# Patient Record
Sex: MALE | Race: WHITE | NOT HISPANIC OR LATINO | Employment: FULL TIME | URBAN - METROPOLITAN AREA
[De-identification: names, ages, dates, MRNs, and addresses within clinical notes are randomized per-mention and may not be internally consistent; named-entity substitution may affect disease eponyms.]

---

## 2018-06-22 ENCOUNTER — OFFICE VISIT CONVERTED (OUTPATIENT)
Dept: PODIATRY | Facility: CLINIC | Age: 53
End: 2018-06-22
Attending: PODIATRIST

## 2018-10-23 ENCOUNTER — CONVERSION ENCOUNTER (OUTPATIENT)
Dept: NEUROLOGY | Facility: CLINIC | Age: 53
End: 2018-10-23

## 2018-10-23 ENCOUNTER — OFFICE VISIT CONVERTED (OUTPATIENT)
Dept: NEUROSURGERY | Facility: CLINIC | Age: 53
End: 2018-10-23
Attending: PHYSICIAN ASSISTANT

## 2018-12-18 ENCOUNTER — CONVERSION ENCOUNTER (OUTPATIENT)
Dept: NEUROLOGY | Facility: CLINIC | Age: 53
End: 2018-12-18

## 2018-12-18 ENCOUNTER — OFFICE VISIT CONVERTED (OUTPATIENT)
Dept: NEUROSURGERY | Facility: CLINIC | Age: 53
End: 2018-12-18
Attending: PHYSICIAN ASSISTANT

## 2020-03-18 ENCOUNTER — OFFICE VISIT CONVERTED (OUTPATIENT)
Dept: ORTHOPEDIC SURGERY | Facility: CLINIC | Age: 55
End: 2020-03-18
Attending: PHYSICIAN ASSISTANT

## 2020-04-22 ENCOUNTER — TELEPHONE CONVERTED (OUTPATIENT)
Dept: ORTHOPEDIC SURGERY | Facility: CLINIC | Age: 55
End: 2020-04-22
Attending: PHYSICIAN ASSISTANT

## 2020-04-22 ENCOUNTER — CONVERSION ENCOUNTER (OUTPATIENT)
Dept: ORTHOPEDIC SURGERY | Facility: CLINIC | Age: 55
End: 2020-04-22

## 2020-05-13 ENCOUNTER — OFFICE VISIT CONVERTED (OUTPATIENT)
Dept: ORTHOPEDIC SURGERY | Facility: CLINIC | Age: 55
End: 2020-05-13
Attending: ORTHOPAEDIC SURGERY

## 2020-05-15 ENCOUNTER — HOSPITAL ENCOUNTER (OUTPATIENT)
Dept: PERIOP | Facility: HOSPITAL | Age: 55
Setting detail: HOSPITAL OUTPATIENT SURGERY
Discharge: HOME | End: 2020-05-15
Attending: ORTHOPAEDIC SURGERY

## 2020-05-29 ENCOUNTER — CONVERSION ENCOUNTER (OUTPATIENT)
Dept: ORTHOPEDIC SURGERY | Facility: CLINIC | Age: 55
End: 2020-05-29

## 2020-05-29 ENCOUNTER — OFFICE VISIT CONVERTED (OUTPATIENT)
Dept: ORTHOPEDIC SURGERY | Facility: CLINIC | Age: 55
End: 2020-05-29
Attending: ORTHOPAEDIC SURGERY

## 2020-06-09 ENCOUNTER — HOSPITAL ENCOUNTER (OUTPATIENT)
Dept: PHYSICAL THERAPY | Facility: CLINIC | Age: 55
Setting detail: RECURRING SERIES
Discharge: STILL ADMITTED OR ADMINISTRATIVE | End: 2020-09-17
Attending: ORTHOPAEDIC SURGERY

## 2020-06-19 ENCOUNTER — OFFICE VISIT CONVERTED (OUTPATIENT)
Dept: ORTHOPEDIC SURGERY | Facility: CLINIC | Age: 55
End: 2020-06-19
Attending: PHYSICIAN ASSISTANT

## 2020-07-31 ENCOUNTER — CONVERSION ENCOUNTER (OUTPATIENT)
Dept: ORTHOPEDIC SURGERY | Facility: CLINIC | Age: 55
End: 2020-07-31

## 2020-07-31 ENCOUNTER — OFFICE VISIT CONVERTED (OUTPATIENT)
Dept: ORTHOPEDIC SURGERY | Facility: CLINIC | Age: 55
End: 2020-07-31
Attending: PHYSICIAN ASSISTANT

## 2020-08-14 ENCOUNTER — OFFICE VISIT CONVERTED (OUTPATIENT)
Dept: ORTHOPEDIC SURGERY | Facility: CLINIC | Age: 55
End: 2020-08-14
Attending: PHYSICIAN ASSISTANT

## 2020-09-24 ENCOUNTER — HOSPITAL ENCOUNTER (OUTPATIENT)
Dept: PHYSICAL THERAPY | Facility: CLINIC | Age: 55
Setting detail: RECURRING SERIES
Discharge: HOME | End: 2020-09-30
Attending: ORTHOPAEDIC SURGERY

## 2020-09-25 ENCOUNTER — OFFICE VISIT CONVERTED (OUTPATIENT)
Dept: ORTHOPEDIC SURGERY | Facility: CLINIC | Age: 55
End: 2020-09-25
Attending: PHYSICIAN ASSISTANT

## 2020-09-25 ENCOUNTER — CONVERSION ENCOUNTER (OUTPATIENT)
Dept: ORTHOPEDIC SURGERY | Facility: CLINIC | Age: 55
End: 2020-09-25

## 2020-11-18 ENCOUNTER — OFFICE VISIT CONVERTED (OUTPATIENT)
Dept: NEUROLOGY | Facility: CLINIC | Age: 55
End: 2020-11-18
Attending: PSYCHIATRY & NEUROLOGY

## 2020-11-20 ENCOUNTER — OFFICE VISIT CONVERTED (OUTPATIENT)
Dept: ORTHOPEDIC SURGERY | Facility: CLINIC | Age: 55
End: 2020-11-20
Attending: ORTHOPAEDIC SURGERY

## 2020-11-24 ENCOUNTER — HOSPITAL ENCOUNTER (OUTPATIENT)
Dept: ORTHOPEDIC SURGERY | Facility: CLINIC | Age: 55
Setting detail: RECURRING SERIES
Discharge: HOME | End: 2021-01-10
Attending: ORTHOPAEDIC SURGERY

## 2021-01-04 ENCOUNTER — OFFICE VISIT CONVERTED (OUTPATIENT)
Dept: ORTHOPEDIC SURGERY | Facility: CLINIC | Age: 56
End: 2021-01-04
Attending: ORTHOPAEDIC SURGERY

## 2021-03-10 ENCOUNTER — HOSPITAL ENCOUNTER (OUTPATIENT)
Dept: PHYSICAL THERAPY | Facility: CLINIC | Age: 56
Setting detail: RECURRING SERIES
Discharge: HOME | End: 2021-04-12
Attending: FAMILY MEDICINE

## 2021-03-24 ENCOUNTER — HOSPITAL ENCOUNTER (OUTPATIENT)
Dept: URGENT CARE | Facility: CLINIC | Age: 56
Discharge: HOME | End: 2021-03-24
Attending: FAMILY MEDICINE

## 2021-03-24 LAB — SARS-COV-2 RNA SPEC QL NAA+PROBE: NOT DETECTED

## 2021-05-10 NOTE — H&P
History and Physical      Patient Name: Jorge King   Patient ID: [id number]   Sex: Male   YOB: 1965    Primary Care Provider: Kana Liang MD   Referring Provider: Radha Resendiz PA-C    Visit Date: November 20, 2020    Provider: Abhijit Pretty MD   Location: [facility]   Location Address: [address] [place] [address]  [id number]   Location Phone: [phone]          Chief Complaint  · Left Wrist Pain      History Of Present Illness  Jorge King is a 55 year old /White male who presents today to [facility]. Patient is following up for his rotator cuff surgery performed on 5/15/2020. He seen a neurologist because of the issue he had with the swelling and pain in his hand shortly starting after surgery. He feels like he has done well with the shoulder repair, but he has had some stiffness of his PIP and MCP joints of his fingers and hand that has shown improvement since last time he seen us.       Past Medical History  Arthritis; Herniated nucleus pulposus, L2-3 left; Herniated nucleus pulposus, L3-4; High cholesterol; Hyperlipemia; Hyperlipidemia; Leg pain; Leg swelling; Muscle cramps; Seasonal allergies         Past Surgical History  Shoulder surgery         Medication List  atorvastatin 20 mg oral tablet; diclofenac sodium 75 mg oral tablet,delayed release (DR/EC); fluticasone propionate 50 mcg/actuation nasal spray,suspension; gabapentin 300 mg oral capsule; loratadine 10 mg oral tablet; meclizine 25 mg oral tablet; montelukast 10 mg oral tablet; ondansetron 4 mg oral tablet,disintegrating         Allergy List  NO KNOWN DRUG ALLERGIES         Family Medical History  Stroke; Family history of Arthritis; Family history of stroke         Social History  Alcohol (Never); Claustophobic (Unknown); lives with children; lives with spouse; ; Recreational Drug Use (Never); Tobacco (Current every day); Working         Review of  "Systems  · Constitutional  o Denies  o : fever, chills, weight loss  · Cardiovascular  o Denies  o : chest pain, shortness of breath  · Gastrointestinal  o Denies  o : liver disease, heartburn, nausea, blood in stools  · Genitourinary  o Denies  o : painful urination, blood in urine  · Integument  o Denies  o : rash, itching  · Neurologic  o Denies  o : headache, weakness, loss of consciousness  · Musculoskeletal  o Denies  o : painful, swollen joints  · Psychiatric  o Denies  o : drug/alcohol addiction, anxiety, depression      Vitals  Date Time BP Position Site L\R Cuff Size HR RR TEMP (F) WT  HT  BMI kg/m2 BSA m2 O2 Sat FR L/min FiO2        11/20/2020 01:17 PM      101 - R   162lbs 0oz 5'  9\" 23.92 1.89 99 %            Physical Examination  · Constitutional  o Appearance  o : well developed, well-nourished, no obvious deformities present  · Head and Face  o Head  o :   § Inspection  § : normocephalic  o Face  o :   § Inspection  § : no facial lesions  · Eyes  o Conjunctivae  o : conjunctivae normal  o Sclerae  o : sclerae white  · Ears, Nose, Mouth and Throat  o Ears  o :   § External Ears  § : appearance within normal limits  § Hearing  § : intact  o Nose  o :   § External Nose  § : appearance normal  · Neck  o Inspection/Palpation  o : normal appearance  o Range of Motion  o : full range of motion  · Respiratory  o Respiratory Effort  o : breathing unlabored  o Inspection of Chest  o : normal appearance  o Auscultation of Lungs  o : no audible wheezing or rales  · Cardiovascular  o Heart  o : regular rate  · Gastrointestinal  o Abdominal Examination  o : soft and non-tender  · Skin and Subcutaneous Tissue  o General Inspection  o : intact, no rashes  · Psychiatric  o General  o : Alert and oriented x3  o Judgement and Insight  o : judgment and insight intact  o Mood and Affect  o : mood normal, affect appropriate  · Right Shoulder  o Inspection  o : Appearance of his shoulder, he has well-healed scars on " shoulder. He has full forward flexion. Good strength empty can testing of rotator cuff.  · Left Wrist  o Inspection  o : He does have near full passive MCP, PIP and DIP joint. Flexion actively stops just short of making a full fist and has decreased strength compared to the other side, but has improved since my last evaluation of him.          Assessment  · Right rotator cuff status-post repair-Aftercare following surgery of the muskuloskeletal system     V54.81  · Left wrist pain     719.43/M25.532  · Complex regional pain syndrome, right upper extremity s/p surgical intervention.     337.20/G90.50      Plan  · Medications  o Medications have been Reconciled  o Transition of Care or Provider Policy  · Instructions  o Reviewed the patient's Past Medical, Social, and Family history as well as the ROS at today's visit, no changes.  o Call or return if worsening symptoms.  o This note is transcribed by Ivette phillips Getting occupational therapy for evaluation of his complex region pain syndrome. Start on Neurontin. He is going to follow-back up and be evaluated in 6 weeks.             Electronically Signed by: Ivette Thomas, -Author on December 2, 2020 02:40:50 PM  Electronically Co-signed by: Abhijit Pretty MD -Reviewer on December 2, 2020 05:01:22 PM

## 2021-05-10 NOTE — H&P
History and Physical      Patient Name: Jorge King   Patient ID: [id number]   Sex: Male   YOB: 1965    Primary Care Provider: Kana Liang MD   Referring Provider: Kana Liang MD    Visit Date: November 18, 2020    Provider: Devonte Moyer MD   Location: [facility]   Location Address: [address] [place] [address]  [id number]   Location Phone: [phone]          Chief Complaint     RUE pain and weakness       History Of Present Illness  Jorge King is a 55 year old /White male who presents today to [facility] today referred from Kana Liang MD.      55-year-old man evaluated for nerve conduction EMG study.  He is complaining of difficulty closing his right hand after he had surgery to his right shoulder and developed right olecranon bursitis which required aspiration.  He states that his problem is his right hand swells up and is painful and it is difficult for him to close his hand.  He is here today for nerve conduction EMG study.       Past Medical History  Arthritis; Herniated nucleus pulposus, L2-3 left; Herniated nucleus pulposus, L3-4; High cholesterol; Hyperlipemia; Hyperlipidemia; Leg pain; Leg swelling; Muscle cramps; Seasonal allergies         Past Surgical History  Shoulder surgery         Medication List  atorvastatin 20 mg oral tablet; diclofenac sodium 75 mg oral tablet,delayed release (DR/EC); fluticasone propionate 50 mcg/actuation nasal spray,suspension; loratadine 10 mg oral tablet; meclizine 25 mg oral tablet; montelukast 10 mg oral tablet; ondansetron 4 mg oral tablet,disintegrating         Allergy List  NO KNOWN DRUG ALLERGIES       Allergies Reconciled  Family Medical History  Stroke; Family history of Arthritis; Family history of stroke         Social History  Alcohol (Never); Claustophobic (Unknown); lives with children; lives with spouse; ; Recreational Drug Use (Never); Tobacco  "(Current every day); Working         Review of Systems  · Constitutional  o Denies  o : chills, excessive sweating, fatigue, fever, sycope/passing out, weight gain, weight loss  · Eyes  o Denies  o : changes in vision, blurry vision, double vision  · HENT  o Denies  o : loss of hearing, ringing in the ears, ear aches, sore throat, nasal congestion, sinus pain, nose bleeds, seasonal allergies  · Cardiovascular  o Denies  o : blood clots, swollen legs, anemia, easy burising or bleeding, transfusions  · Respiratory  o Denies  o : shortness of breath, dry cough, productive cough, pneumonia, COPD  · Gastrointestinal  o Denies  o : difficulty swallowing, reflux  · Genitourinary  o Denies  o : incontinence  · Neurologic  o Admits  o : numbness/tingling/paresthesia   o Denies  o : headache, seizure, stroke, tremor, loss of balance, falls, dizziness/vertigo, difficulty with sleep, difficulty with coordination, difficulty with dexterity, weakness  · Musculoskeletal  o Admits  o : muscle aches, weakness  o Denies  o : neck stiffness/pain, swollen lymph nodes, joint pain, spasms, sciatica, pain radiating in arm, pain radiating in leg, low back pain  · Endocrine  o Denies  o : diabetes, thyroid disorder  · Psychiatric  o Denies  o : anxiety, depression      Vitals  Date Time BP Position Site L\R Cuff Size HR RR TEMP (F) WT  HT  BMI kg/m2 BSA m2 O2 Sat FR L/min FiO2 HC       11/18/2020 09:49 /85 Sitting    100 - R   162lbs 0oz 5'  9\" 23.92 1.89             Physical Examination     There is giveaway weakness of the upper extremity on examination however when the EMG study was performed there is no weakness of the right upper extremities on individual muscle testing.  There is limited range of motion of the right hand and trying to close it.  He cannot make a fist.           Assessment  · Weakness of right arm     729.89/R29.898  The EMG study of the right upper extremity is normal and does not show electrophysiologic " evidence for cervical radiculopathy involving the C5-T1 ventral nerve roots. There is no evidence of denervation or reinnervation in the muscles tested. Nerve conduction study shows mild bilateral carpal tunnel syndrome. This does not explain his symptoms of difficulty in making a fist with his right hand. It is possible that the patient has developed complex regional pain syndrome what used to be called the hand shoulder syndrome. I will leave the diagnosis and differential diagnosis to orthopedics.    10 minutes was spent for this straightforward complexity encounter more than half the time was spent face-to-face with the patient for examination, counseling, planning and recommendations.      Plan  · Orders  o Muscle test done with Nerve test Complete (07230) - 729.89/R29.898 - 11/18/2020  o Nerve conduction studies; 5-6 studies (43145) - 729.89/R29.898 - 11/18/2020  · Medications  o Medications have been Reconciled  o Transition of Care or Provider Policy  · Instructions  o Encouraged to follow-up with Primary Care Provider for preventative care.            Electronically Signed by: Devonte Moyer MD -Author on November 18, 2020 11:36:06 AM

## 2021-05-12 NOTE — PROGRESS NOTES
"   Quick Note      Patient Name: Jorge King   Patient ID: [id number]   Sex: Male   YOB: 1965    Primary Care Provider: Kana Liang MD   Referring Provider: Kana Liang MD    Visit Date: April 22, 2020    Provider: Radha Resendiz PA-C   Location: [facility]   Location Address: [address] [place] [address]  [id number]   Location Phone: [phone]          History Of Present Illness  TELEHEALTH TELEPHONE VISIT  Chief Complaint: Right shoulder pain   Jorge King is a 55 year old /White male who is presenting for evaluation via telehealth telephone visit. Verbal consent obtained before beginning visit.   Provider spent 5 minutes with the patient during telehealth visit.   The following staff were present during this visit: Radha Resendiz PA-C   Past Medical History/Overview of Patient Symptoms     He is following up for right shoulder pain ongoing for several months. He states history of midshaft clavicle fracture many years ago. He states pain is in anterior shoulder and points to AC joint. Dr. Villalobos injected subacromial space without improvement. MRI revealed partial RC tear. He was advised to take diclofenac only PRN.  AC joint was injected at last visit, which relieved pain more effectively than subacromial injection, but has since worn off.      Assessment: Right AC arthrosis, shoulder impingement, pRCT  Plan: Follow up 3 weeks, consider arthroscopy, possible RCR       Vitals  Date Time BP Position Site L\R Cuff Size HR RR TEMP (F) WT  HT  BMI kg/m2 BSA m2 O2 Sat HC       04/22/2020 10:23 AM         155lbs 0oz 5'  9.75\" 22.4 1.86               Plan  · Instructions  o Plan Of Care:             Electronically Signed by: Radha Resendiz PA-C -Author on April 22, 2020 10:30:55 AM  Electronically Co-signed by: Abhijit Pretty MD -Reviewer on April 22, 2020 11:53:13 AM  "

## 2021-05-13 NOTE — PROGRESS NOTES
Progress Note      Patient Name: Jorge King   Patient ID: [id number]   Sex: Male   YOB: 1965    Primary Care Provider: Kana Liang MD   Referring Provider: Kana Liang MD    Visit Date: August 14, 2020    Provider: Radha Resendiz PA-C   Location: [facility]   Location Address: [address] [place] [address]  [id number]   Location Phone: [phone]          Chief Complaint  · Right Shoulder Pain      History Of Present Illness  Jorge King is a 55 year old /White male who presents today to [facility]. He is following-up for right rotator cuff repair on May 15, 2020 and also right olecranon bursitis, which I aspirated. He states his elbow swelling did not come back and he continues to have some swelling in his right hand and numbness and shooting pain in his fingers.       Past Medical History  Arthritis; Herniated nucleus pulposus, L2-3 left; Herniated nucleus pulposus, L3-4; High cholesterol; Hyperlipemia; Hyperlipidemia; Leg pain; Leg swelling; Muscle cramps; Seasonal allergies         Past Surgical History  *Denies any surgical procedures; *I have had no surgeries; I have had no surgeries         Medication List  atorvastatin 10 mg oral tablet; fluticasone propionate 50 mcg/actuation nasal spray,suspension; loratadine 10 mg oral tablet; meclizine 25 mg oral tablet; montelukast 10 mg oral tablet; ondansetron 4 mg oral tablet,disintegrating; Voltaren 1 % topical gel         Allergy List  NO KNOWN DRUG ALLERGIES         Family Medical History  Stroke; Family history of Arthritis; Family history of stroke         Social History  Alcohol (Never); Alcohol Use (Never); Claustophobic (Unknown); lives with children; lives with spouse; ; .; Recreational Drug Use (Never); Tobacco (Current every day); Working         Review of Systems  · Constitutional  o Denies  o : fever, chills, weight loss  · Cardiovascular  o Denies  o : chest pain,  "shortness of breath  · Gastrointestinal  o Denies  o : liver disease, heartburn, nausea, blood in stools  · Genitourinary  o Denies  o : painful urination, blood in urine  · Integument  o Denies  o : rash, itching  · Neurologic  o Denies  o : headache, weakness, loss of consciousness  · Musculoskeletal  o Denies  o : painful, swollen joints  · Psychiatric  o Denies  o : drug/alcohol addiction, anxiety, depression      Vitals  Date Time BP Position Site L\R Cuff Size HR RR TEMP (F) WT  HT  BMI kg/m2 BSA m2 O2 Sat        08/14/2020 03:48 PM      86 - R   165lbs 0oz 5'  9.75\" 23.84 1.92 98 %          Physical Examination  · Constitutional  o Appearance  o : well developed, well-nourished, no obvious deformities present  · Head and Face  o Head  o :   § Inspection  § : normocephalic  o Face  o :   § Inspection  § : no facial lesions  · Eyes  o Conjunctivae  o : conjunctivae normal  o Sclerae  o : sclerae white  · Ears, Nose, Mouth and Throat  o Ears  o :   § External Ears  § : appearance within normal limits  § Hearing  § : intact  o Nose  o :   § External Nose  § : appearance normal  · Neck  o Inspection/Palpation  o : normal appearance  o Range of Motion  o : full range of motion  · Respiratory  o Respiratory Effort  o : breathing unlabored  o Inspection of Chest  o : normal appearance  o Auscultation of Lungs  o : no audible wheezing or rales  · Cardiovascular  o Heart  o : regular rate  · Gastrointestinal  o Abdominal Examination  o : soft and non-tender  · Skin and Subcutaneous Tissue  o General Inspection  o : intact, no rashes  · Psychiatric  o General  o : Alert and oriented x3  o Judgement and Insight  o : judgment and insight intact  o Mood and Affect  o : mood normal, affect appropriate  · Right Shoulder  o Inspection  o : shooting pain in his fingers. Right shoulder incisions are well-healed. He has forward flexion to about 130 degrees. Abduction 120 degrees. Internal rotation to L5. External rotation 45 " degrees. He is wearing a compression glove on his right hand. His right elbow, olecranon bursa is non-swollen. Neurovascularly intact.           Assessment  · Right rotator cuff repair-Aftercare following surgery of the muskuloskeletal system     V54.81  · Right olecranon bursitis     727.3/M71.9  · Right shoulder pain, unspecified chronicity     719.41/M25.511  · Right upper extremity radiculopathy     729.2/M54.10      Plan  · Medications  o Medications have been Reconciled  o Transition of Care or Provider Policy  · Instructions  o Reviewed the patient's Past Medical, Social, and Family history as well as the ROS at today's visit, no changes.  o Call or return if worsening symptoms.  o This note is transcribed by Ivette loza/anna  o Going to add some cervical traction to physical therapy. Follow-up in 6 weeks.             Electronically Signed by: Ivette Thomas, -Author on August 18, 2020 12:36:23 PM  Electronically Co-signed by: Radha Resendiz PA-C -Reviewer on August 19, 2020 07:30:20 AM

## 2021-05-13 NOTE — PROGRESS NOTES
Progress Note      Patient Name: Jorge King   Patient ID: [id number]   Sex: Male   YOB: 1965    Primary Care Provider: Kana Liang MD   Referring Provider: Kana Liang MD    Visit Date: May 13, 2020    Provider: Abhjiit Pretty MD   Location: [facility]   Location Address: [address] [place] [address]  [id number]   Location Phone: [phone]          Chief Complaint  · Right Shoulder Pain      History Of Present Illness  Jorge King is a 55 year old /White male who presents today to [facility]. He is here for right shoulder pain. He has history of right shoulder pain. He has a recent MRI. He denies any other complaints besides pain in the shoulder. He wants to consider surgical intervention.       Past Medical History  Arthritis; Herniated nucleus pulposus, L2-3 left; Herniated nucleus pulposus, L3-4; High cholesterol; Hyperlipemia; Hyperlipidemia; Leg pain; Leg swelling; Muscle cramps; Seasonal allergies         Past Surgical History  *Denies any surgical procedures; *I have had no surgeries; I have had no surgeries         Medication List  atorvastatin 10 mg oral tablet; fluticasone propionate 50 mcg/actuation nasal spray,suspension; loratadine 10 mg oral tablet; meclizine 25 mg oral tablet; montelukast 10 mg oral tablet; ondansetron 4 mg oral tablet,disintegrating; Voltaren 1 % topical gel         Allergy List  NO KNOWN DRUG ALLERGIES         Family Medical History  Stroke; Family history of Arthritis; Family history of stroke         Social History  Alcohol (Never); Alcohol Use (Never); Claustophobic (Unknown); lives with children; lives with spouse; ; .; Recreational Drug Use (Never); Tobacco (Current every day); Working         Review of Systems  · Constitutional  o Denies  o : fever, chills, weight loss  · Cardiovascular  o Denies  o : chest pain, shortness of breath  · Gastrointestinal  o Denies  o : liver disease, heartburn,  "nausea, blood in stools  · Genitourinary  o Denies  o : painful urination, blood in urine  · Integument  o Denies  o : rash, itching  · Neurologic  o Denies  o : headache, weakness, loss of consciousness  · Musculoskeletal  o Denies  o : painful, swollen joints  · Psychiatric  o Denies  o : drug/alcohol addiction, anxiety, depression      Vitals  Date Time BP Position Site L\R Cuff Size HR RR TEMP (F) WT  HT  BMI kg/m2 BSA m2 O2 Sat        05/13/2020 09:54 AM      88 - R   164lbs 8oz 5'  9\" 24.29 1.91 99 %          Physical Examination  · Constitutional  o Appearance  o : well developed, well-nourished, no obvious deformities present  · Head and Face  o Head  o :   § Inspection  § : normocephalic  o Face  o :   § Inspection  § : no facial lesions  · Eyes  o Conjunctivae  o : conjunctivae normal  o Sclerae  o : sclerae white  · Ears, Nose, Mouth and Throat  o Ears  o :   § External Ears  § : appearance within normal limits  § Hearing  § : intact  o Nose  o :   § External Nose  § : appearance normal  · Neck  o Inspection/Palpation  o : normal appearance  o Range of Motion  o : full range of motion  · Respiratory  o Respiratory Effort  o : breathing unlabored  o Inspection of Chest  o : normal appearance  o Auscultation of Lungs  o : no audible wheezing or rales  · Cardiovascular  o Heart  o : regular rate  · Gastrointestinal  o Abdominal Examination  o : soft and non-tender  · Skin and Subcutaneous Tissue  o General Inspection  o : intact, no rashes  · Psychiatric  o General  o : Alert and oriented x3  o Judgement and Insight  o : judgment and insight intact  o Mood and Affect  o : mood normal, affect appropriate  · Right Shoulder  o Inspection  o : Appearance of his right shoulder is normal compared to the other side. No atrophy or skin discoloration. He has full range of motion of his shoulder. He has tenderness over the AC joint. Positive impingement sign. He has weakness with external rotation testing and empty " can testing. Neurovascularly intact. Sensation grossly intact.               Assessment  · Right shoulder pain, unspecified chronicity     719.41/M25.511  · Right rotator cuff tear     840.4/M75.100  · Shoulder impingement syndrome, right     726.2/M75.41      Plan  · Medications  o Medications have been Reconciled  o Transition of Care or Provider Policy  · Instructions  o Reviewed the patient's Past Medical, Social, and Family history as well as the ROS at today's visit, no changes.  o Call or return if worsening symptoms.  o Discussed surgery.  o Risks/benefits discussed with patient including, but not limited to: infection, bleeding, neurovascular damage, malunion, nonunion, aesthetic deformity, need for further surgery, and death.  o Surgery pamphlet given.  o This note is transcribed by Ivette castillo  o I reviewed the MRI and we had a long discussion. He wants to proceed with right shoulder arthroscopic subacromial decompression, rotator cuff repair, distal claviculectomy. See him back post-op.  o Electronically Identified Patient Education Materials Provided Electronically            Electronically Signed by: Ivette Thomas, -Author on May 14, 2020 10:35:52 AM  Electronically Co-signed by: Abhijit Pretty MD -Reviewer on May 15, 2020 04:31:43 PM

## 2021-05-13 NOTE — PROGRESS NOTES
Progress Note      Patient Name: Jorge King   Patient ID: [id number]   Sex: Male   YOB: 1965    Primary Care Provider: Kana Liang MD   Referring Provider: Kana Liang MD    Visit Date: July 31, 2020    Provider: Radha Resendiz PA-C   Location: [facility]   Location Address: [address] [place] [address]  [id number]   Location Phone: [phone]          Chief Complaint  · Right shoulder pain      History Of Present Illness  Jorge King is a 55 year old /White male who presents today to [facility].      He is s/p right arthroscopic SAD/DC and mini open RCR 5/15/20 by Dr. Pretty. He is doing well and making progress in PT. He complains of swelling of right elbow, forearm, hand x one month. His PCP drained olecranon bursa 2 weeks ago.       Past Medical History  Arthritis; Herniated nucleus pulposus, L2-3 left; Herniated nucleus pulposus, L3-4; High cholesterol; Hyperlipemia; Hyperlipidemia; Leg pain; Leg swelling; Muscle cramps; Seasonal allergies         Past Surgical History  *Denies any surgical procedures; *I have had no surgeries; I have had no surgeries         Medication List  atorvastatin 10 mg oral tablet; fluticasone propionate 50 mcg/actuation nasal spray,suspension; loratadine 10 mg oral tablet; meclizine 25 mg oral tablet; montelukast 10 mg oral tablet; ondansetron 4 mg oral tablet,disintegrating; Voltaren 1 % topical gel         Allergy List  NO KNOWN DRUG ALLERGIES         Family Medical History  Stroke; Family history of Arthritis; Family history of stroke         Social History  Alcohol (Never); Alcohol Use (Never); Claustophobic (Unknown); lives with children; lives with spouse; ; .; Recreational Drug Use (Never); Tobacco (Current every day); Working         Review of Systems  · Constitutional  o Denies  o : fever, chills, weight loss  · Cardiovascular  o Denies  o : chest pain, shortness of  "breath  · Gastrointestinal  o Denies  o : liver disease, heartburn, nausea, blood in stools  · Genitourinary  o Denies  o : painful urination, blood in urine  · Integument  o Denies  o : rash, itching  · Neurologic  o Denies  o : headache, weakness, loss of consciousness  · Musculoskeletal  o Admits  o : painful, swollen joints  · Psychiatric  o Denies  o : drug/alcohol addiction, anxiety, depression      Vitals  Date Time BP Position Site L\R Cuff Size HR RR TEMP (F) WT  HT  BMI kg/m2 BSA m2 O2 Sat        07/31/2020 02:06 PM         165lbs 0oz 5'  9.75\" 23.84 1.92           Physical Examination  · Constitutional  o Appearance  o : well developed, well-nourished, no obvious deformities present  · Head and Face  o Head  o :   § Inspection  § : normocephalic  o Face  o :   § Inspection  § : no facial lesions  · Eyes  o Conjunctivae  o : conjunctivae normal  o Sclerae  o : sclerae white  · Ears, Nose, Mouth and Throat  o Ears  o :   § External Ears  § : appearance within normal limits  § Hearing  § : intact  o Nose  o :   § External Nose  § : appearance normal  · Neck  o Inspection/Palpation  o : normal appearance  o Range of Motion  o : full range of motion  · Respiratory  o Respiratory Effort  o : breathing unlabored  o Inspection of Chest  o : normal appearance  o Auscultation of Lungs  o : no audible wheezing or rales  · Cardiovascular  o Heart  o : regular rate  · Gastrointestinal  o Abdominal Examination  o : soft and non-tender  · Skin and Subcutaneous Tissue  o General Inspection  o : intact, no rashes  · Psychiatric  o General  o : Alert and oriented x3  o Judgement and Insight  o : judgment and insight intact  o Mood and Affect  o : mood normal, affect appropriate  · Right Shoulder  o Inspection  o : Well healed incisions. Forward flexion 150 degrees. Abduction 100 degrees. ER 45 degrees. Strength 4/5. Neurovascularly intact.   · Right Elbow  o Inspection  o : Fluid collection over olecranon. No erythema " streaking. Mild swelling of forearm and digits. Neurovascularly intact.   · Injection Note/Aspiration Note  o Site  o : elbow  o Procedure  o : Procedure: After educating the patient, patient gave consent for procedure. After using Chloraprep, the joint space was injected. The patient tolerated the procedure well.   o Medication  o : 80 mg of DepoMedrol with 1cc of 1% Lidocaine          Assessment  · Aftercare following surgery of the muskuloskeletal system     V54.81  · Pain: Elbow     719.42/M25.529  · Right shoulder pain, unspecified chronicity     719.41/M25.511  · Olecranon bursitis     726.33/M70.20      Plan  · Orders  o Depo-Medrol injection 80mg () - 719.42/M25.529 - 07/31/2020   Lot [id number] exp 06 21 Romi ROWLAND  o Elbow-Lat Single Tendon (Injection) (45018) - 719.42/M25.529 - 07/31/2020   Lot 08 080 DK exp 08 21 \[facility] [place] [facility]\"" Radha ROWLAND  · Medications  o Medications have been Reconciled  o Transition of Care or Provider Policy  · Instructions  o Reviewed the patient's Past Medical, Social, and Family history as well as the ROS at today's visit, no changes.  o Call or return if worsening symptoms.  o Right elbow aspirated. Follow Up in 2 weeks.            Electronically Signed by: LISA SandersC -Author on July 31, 2020 03:02:11 PM

## 2021-05-13 NOTE — PROGRESS NOTES
Progress Note      Patient Name: Jorge King   Patient ID: [id number]   Sex: Male   YOB: 1965    Primary Care Provider: Kana Liang MD   Referring Provider: Kana Liang MD    Visit Date: June 19, 2020    Provider: Radha Resendiz PA-C   Location: [facility]   Location Address: [address] [place] [address]  [id number]   Location Phone: [phone]          Chief Complaint  · Follow up right shoulder arthroscopy      History Of Present Illness  Jorge King is a 55 year old /White male who presents today to [facility].      He is s/p right arthroscopic SAD/DC and mini open RCR 5/15/20 by Dr. Pretty. He is doing well and making progress in PT.       Past Medical History  Arthritis; Herniated nucleus pulposus, L2-3 left; Herniated nucleus pulposus, L3-4; High cholesterol; Hyperlipemia; Hyperlipidemia; Leg pain; Leg swelling; Muscle cramps; Seasonal allergies         Past Surgical History  *Denies any surgical procedures; *I have had no surgeries; I have had no surgeries         Medication List  atorvastatin 10 mg oral tablet; fluticasone propionate 50 mcg/actuation nasal spray,suspension; loratadine 10 mg oral tablet; meclizine 25 mg oral tablet; montelukast 10 mg oral tablet; ondansetron 4 mg oral tablet,disintegrating; Voltaren 1 % topical gel         Allergy List  NO KNOWN DRUG ALLERGIES       Allergies Reconciled  Family Medical History  Stroke; Family history of Arthritis; Family history of stroke         Social History  Alcohol (Never); Alcohol Use (Never); Claustophobic (Unknown); lives with children; lives with spouse; ; .; Recreational Drug Use (Never); Tobacco (Current every day); Working         Review of Systems  · Constitutional  o Denies  o : fever, chills, weight loss  · Cardiovascular  o Denies  o : chest pain, shortness of breath  · Gastrointestinal  o Denies  o : liver disease, heartburn, nausea, blood in  "stools  · Genitourinary  o Denies  o : painful urination, blood in urine  · Integument  o Denies  o : rash, itching  · Neurologic  o Denies  o : headache, weakness, loss of consciousness  · Musculoskeletal  o Admits  o : painful, swollen joints  · Psychiatric  o Denies  o : drug/alcohol addiction, anxiety, depression      Vitals  Date Time BP Position Site L\R Cuff Size HR RR TEMP (F) WT  HT  BMI kg/m2 BSA m2 O2 Sat        06/19/2020 03:43 PM      91 - R   165lbs 0oz 5'  10\" 23.67 1.92 98 %          Physical Examination  · Constitutional  o Appearance  o : well developed, well-nourished, no obvious deformities present  · Head and Face  o Head  o :   § Inspection  § : normocephalic  o Face  o :   § Inspection  § : no facial lesions  · Eyes  o Conjunctivae  o : conjunctivae normal  o Sclerae  o : sclerae white  · Ears, Nose, Mouth and Throat  o Ears  o :   § External Ears  § : appearance within normal limits  § Hearing  § : intact  o Nose  o :   § External Nose  § : appearance normal  · Neck  o Inspection/Palpation  o : normal appearance  o Range of Motion  o : full range of motion  · Respiratory  o Respiratory Effort  o : breathing unlabored  o Inspection of Chest  o : normal appearance  o Auscultation of Lungs  o : no audible wheezing or rales  · Cardiovascular  o Heart  o : regular rate  · Gastrointestinal  o Abdominal Examination  o : soft and non-tender  · Skin and Subcutaneous Tissue  o General Inspection  o : intact, no rashes  · Psychiatric  o General  o : Alert and oriented x3  o Judgement and Insight  o : judgment and insight intact  o Mood and Affect  o : mood normal, affect appropriate  · Right Shoulder  o Inspection  o : Well healed incisions. Froward flexion 75 degrees. Abduction 60 degrees. ER 45 degrees. Strength 3/5. Neurovascularly intact.           Assessment  · Right Aftercare following surgery of the muskuloskeletal system     V54.81      Plan  · Medications  o Medications have been " Reconciled  o Transition of Care or Provider Policy  · Instructions  o Reviewed the patient's Past Medical, Social, and Family history as well as the ROS at today's visit, no changes.  o Call or return if worsening symptoms.  o Continue PT. Follow up in 5-6 weeks.  o Electronically Identified Patient Education Materials Provided Electronically            Electronically Signed by: LISA SandersC -Author on June 19, 2020 04:36:15 PM

## 2021-05-13 NOTE — PROGRESS NOTES
Progress Note      Patient Name: Jorge King   Patient ID: [id number]   Sex: Male   YOB: 1965    Primary Care Provider: Kana Liang MD   Referring Provider: Kana Liang MD    Visit Date: May 29, 2020    Provider: Abhijit Pretty MD   Location: [facility]   Location Address: [address] [place] [address]  [id number]   Location Phone: [phone]          Chief Complaint  · Surgery follow up right shoulder arthroscopy      History Of Present Illness  Jorge King is a 55 year old /White male who presents today to [facility].      He's following up for right shoulder decompression and rotator cuff repair. He's doing well. He's 2 weeks out from right shoulder arthroscopy. No fevers. No drainage from incisions.       Past Medical History  Arthritis; Herniated nucleus pulposus, L2-3 left; Herniated nucleus pulposus, L3-4; High cholesterol; Hyperlipemia; Hyperlipidemia; Leg pain; Leg swelling; Muscle cramps; Seasonal allergies         Past Surgical History  *Denies any surgical procedures; *I have had no surgeries; I have had no surgeries         Medication List  atorvastatin 10 mg oral tablet; fluticasone propionate 50 mcg/actuation nasal spray,suspension; loratadine 10 mg oral tablet; meclizine 25 mg oral tablet; montelukast 10 mg oral tablet; ondansetron 4 mg oral tablet,disintegrating; Voltaren 1 % topical gel         Allergy List  NO KNOWN DRUG ALLERGIES         Family Medical History  Stroke; Family history of Arthritis; Family history of stroke         Social History  Alcohol (Never); Alcohol Use (Never); Claustophobic (Unknown); lives with children; lives with spouse; ; .; Recreational Drug Use (Never); Tobacco (Current every day); Working         Review of Systems  · Constitutional  o Denies  o : fever, chills, weight loss  · Cardiovascular  o Denies  o : chest pain, shortness of breath  · Gastrointestinal  o Denies  o : liver disease,  "heartburn, nausea, blood in stools  · Genitourinary  o Denies  o : painful urination, blood in urine  · Integument  o Denies  o : rash, itching  · Neurologic  o Denies  o : headache, weakness, loss of consciousness  · Musculoskeletal  o Denies  o : painful, swollen joints  · Psychiatric  o Denies  o : drug/alcohol addiction, anxiety, depression      Vitals  Date Time BP Position Site L\R Cuff Size HR RR TEMP (F) WT  HT  BMI kg/m2 BSA m2 O2 Sat        05/29/2020 03:01 PM      86 - R   160lbs 0oz 5'  10\" 22.96 1.89 98 %          Physical Examination  · Constitutional  o Appearance  o : well developed, well-nourished, no obvious deformities present  · Head and Face  o Head  o :   § Inspection  § : normocephalic  o Face  o :   § Inspection  § : no facial lesions  · Eyes  o Conjunctivae  o : conjunctivae normal  o Sclerae  o : sclerae white  · Ears, Nose, Mouth and Throat  o Ears  o :   § External Ears  § : appearance within normal limits  § Hearing  § : intact  o Nose  o :   § External Nose  § : appearance normal  · Neck  o Inspection/Palpation  o : normal appearance  o Range of Motion  o : full range of motion  · Respiratory  o Respiratory Effort  o : breathing unlabored  o Inspection of Chest  o : normal appearance  o Auscultation of Lungs  o : no audible wheezing or rales  · Cardiovascular  o Heart  o : regular rate  · Gastrointestinal  o Abdominal Examination  o : soft and non-tender  · Skin and Subcutaneous Tissue  o General Inspection  o : intact, no rashes  · Psychiatric  o General  o : Alert and oriented x3  o Judgement and Insight  o : judgment and insight intact  o Mood and Affect  o : mood normal, affect appropriate  · Right Shoulder  o Inspection  o : Incisions well-healed. No signs of infection. Sutures removed today. Minimal swelling. Full elbow, wrist and hand ROM. Passive range of motion to 60 degrees of forward flexion.           Assessment  · S/P Right Shoulder Arthroscopic SADDC and Rotator Cuff " Repair     V54.81      Plan  · Medications  o Medications have been Reconciled  o Transition of Care or Provider Policy  · Instructions  o Reviewed the patient's Past Medical, Social, and Family history as well as the ROS at today's visit, no changes.  o Call or return if worsening symptoms.  o This note was transcribed by Izabella Wallace. anna  o We discussed conservative management and physical therapy to start in 2-3 weeks. I wrote a prescription for that. We discussed sling usage and home exercises. He's going to follow back up in 1 month.             Electronically Signed by: Izabella Wallace - , Other -Author on June 4, 2020 03:09:09 PM  Electronically Co-signed by: Abhijit Pretty MD -Reviewer on June 8, 2020 09:27:29 AM

## 2021-05-13 NOTE — PROGRESS NOTES
Progress Note      Patient Name: Jorge King   Patient ID: [id number]   Sex: Male   YOB: 1965    Primary Care Provider: Kana Liang MD   Referring Provider: Kana Liang MD    Visit Date: September 25, 2020    Provider: Radha Resendiz PA-C   Location: [facility]   Location Address: [address] [place] [address]  [id number]   Location Phone: [phone]          Chief Complaint  · Right Shoulder Pain      History Of Present Illness  Jorge King is a 55 year old /White male who presents today to [facility].      The patient presents here today for follow up evaluation of his left shoulder pain. The patient is s/p right rotator cuff repair on May 15, 2020. He is overall doing well today. He states his motion and strength are improving. I aspirated his right elbow for olecranon bursitis on 7/31/2020. He states his elbow is better, no reoccurring fluid collection.  He does state his right hand is still feeling numb and weak. He also complains today of is right forearm being weak as well.       Past Medical History  Arthritis; Herniated nucleus pulposus, L2-3 left; Herniated nucleus pulposus, L3-4; High cholesterol; Hyperlipemia; Hyperlipidemia; Leg pain; Leg swelling; Muscle cramps; Seasonal allergies         Past Surgical History  *Denies any surgical procedures; *I have had no surgeries; I have had no surgeries         Medication List  atorvastatin 10 mg oral tablet; fluticasone propionate 50 mcg/actuation nasal spray,suspension; loratadine 10 mg oral tablet; meclizine 25 mg oral tablet; montelukast 10 mg oral tablet; ondansetron 4 mg oral tablet,disintegrating; Voltaren 1 % topical gel         Allergy List  NO KNOWN DRUG ALLERGIES       Allergies Reconciled  Family Medical History  Stroke; Family history of Arthritis; Family history of stroke         Social History  Alcohol (Never); Alcohol Use (Never); Claustophobic (Unknown); lives with  "children; lives with spouse; ; .; Recreational Drug Use (Never); Tobacco (Current every day); Working         Review of Systems  · Constitutional  o Denies  o : fever, chills, weight loss  · Cardiovascular  o Denies  o : chest pain, shortness of breath  · Gastrointestinal  o Denies  o : liver disease, heartburn, nausea, blood in stools  · Genitourinary  o Denies  o : painful urination, blood in urine  · Integument  o Denies  o : rash, itching  · Neurologic  o Denies  o : headache, weakness, loss of consciousness  · Musculoskeletal  o Denies  o : painful, swollen joints  · Psychiatric  o Denies  o : drug/alcohol addiction, anxiety, depression      Vitals  Date Time BP Position Site L\R Cuff Size HR RR TEMP (F) WT  HT  BMI kg/m2 BSA m2 O2 Sat        09/25/2020 03:09 PM         165lbs 0oz 5'  9\" 24.37 1.91           Physical Examination  · Constitutional  o Appearance  o : well developed, well-nourished, no obvious deformities present  · Head and Face  o Head  o :   § Inspection  § : normocephalic  o Face  o :   § Inspection  § : no facial lesions  · Eyes  o Conjunctivae  o : conjunctivae normal  o Sclerae  o : sclerae white  · Ears, Nose, Mouth and Throat  o Ears  o :   § External Ears  § : appearance within normal limits  § Hearing  § : intact  o Nose  o :   § External Nose  § : appearance normal  · Neck  o Inspection/Palpation  o : normal appearance  o Range of Motion  o : full range of motion  · Respiratory  o Respiratory Effort  o : breathing unlabored  o Inspection of Chest  o : normal appearance  o Auscultation of Lungs  o : no audible wheezing or rales  · Cardiovascular  o Heart  o : regular rate  · Gastrointestinal  o Abdominal Examination  o : soft and non-tender  · Skin and Subcutaneous Tissue  o General Inspection  o : intact, no rashes  · Psychiatric  o General  o : Alert and oriented x3  o Judgement and Insight  o : judgment and insight intact  o Mood and Affect  o : mood normal, affect " appropriate  · Extremities  o Extremities  o : Left shoulder has well healed incisions. Near full ROM. Strength 4/5. Right Elbow has Full ROM. No olecranon bursitis. Right hand is mildly swollen and clammy. Decrease  strength.           Assessment  · Aftercare following Right rotator cuff repair SAD/DC     V54.81  · Right shoulder pain, unspecified chronicity     719.41/M25.511  · Olecranon bursitis of right elbow     726.33/M70.21  · Right Hand numbness     782.0/R20.0  · Right Hand weakness     728.87/R29.898      Plan  · Medications  o Medications have been Reconciled  o Transition of Care or Provider Policy  · Instructions  o Reviewed the patient's Past Medical, Social, and Family history as well as the ROS at today's visit, no changes.  o Call or return if worsening symptoms.  o Follow Up after EMG.  o This note was transcribed by Chey Gonzales. lw.  o Plan EMG/NCV for right upper extremity. Will follow up with me for results.   o Electronically Identified Patient Education Materials Provided Electronically            Electronically Signed by: Chey Gonzales MA -Author on September 28, 2020 01:25:56 PM  Electronically Co-signed by: Radha Resendiz PA-C -Reviewer on September 28, 2020 04:18:40 PM

## 2021-05-14 VITALS — WEIGHT: 162 LBS | BODY MASS INDEX: 23.99 KG/M2 | OXYGEN SATURATION: 99 % | HEART RATE: 101 BPM | HEIGHT: 69 IN

## 2021-05-14 VITALS — HEART RATE: 89 BPM | BODY MASS INDEX: 24.49 KG/M2 | WEIGHT: 165.37 LBS | HEIGHT: 69 IN | OXYGEN SATURATION: 99 %

## 2021-05-14 VITALS
HEART RATE: 100 BPM | BODY MASS INDEX: 23.99 KG/M2 | WEIGHT: 162 LBS | DIASTOLIC BLOOD PRESSURE: 85 MMHG | HEIGHT: 69 IN | SYSTOLIC BLOOD PRESSURE: 145 MMHG

## 2021-05-14 VITALS — HEIGHT: 69 IN | BODY MASS INDEX: 24.44 KG/M2 | WEIGHT: 165 LBS

## 2021-05-14 NOTE — PROGRESS NOTES
Progress Note      Patient Name: Jorge King   Patient ID: [id number]   Sex: Male   YOB: 1965    Primary Care Provider: Kana Liang MD   Referring Provider: Radha Resendiz PA-C    Visit Date: January 4, 2021    Provider: Abhijit Prtety MD   Location: [facility]   Location Address: [address] [place] [address]  [id number]   Location Phone: [phone]          Chief Complaint  · Right shoulder pain   · Right hand pain       History Of Present Illness  Jorge King is a 55 year old /White male who presents today to [facility].      Patient presents today with a follow-up of right shoulder pain and right hand pain. Patient is s/p right rotator cuff repair SAD/DC performed on 5/15/20. We saw patient on 11/2020 and gave him a prescription for occupational therapy for complex region pain syndrome. We started the patient on Neurontin last visit as well. Patient had seen a neurologist because of the issue he had with the swelling and pain in his hand shortly starting after surgery. Patient states that his shoulder is doing great but his hand is the same. He states the tens unit seems to be helping his hand some. He states he has a small knot on his shoulder and is unsure of what it is.          Past Medical History  Arthritis; Herniated nucleus pulposus, L2-3 left; Herniated nucleus pulposus, L3-4; High cholesterol; Hyperlipemia; Hyperlipidemia; Leg pain; Leg swelling; Muscle cramps; Seasonal allergies         Past Surgical History  Shoulder surgery         Medication List  atorvastatin 20 mg oral tablet; diclofenac sodium 75 mg oral tablet,delayed release (DR/EC); fluticasone propionate 50 mcg/actuation nasal spray,suspension; gabapentin 300 mg oral capsule; loratadine 10 mg oral tablet; meclizine 25 mg oral tablet; montelukast 10 mg oral tablet; ondansetron 4 mg oral tablet,disintegrating         Allergy List  NO KNOWN DRUG ALLERGIES       Allergies  "Reconciled  Family Medical History  Stroke; Family history of Arthritis; Family history of stroke         Social History  Alcohol (Never); Claustophobic (Unknown); lives with children; lives with spouse; ; Recreational Drug Use (Never); Tobacco (Current every day); Working         Review of Systems  · Constitutional  o Denies  o : fever, chills, weight loss  · Cardiovascular  o Denies  o : chest pain, shortness of breath  · Gastrointestinal  o Denies  o : liver disease, heartburn, nausea, blood in stools  · Genitourinary  o Denies  o : painful urination, blood in urine  · Integument  o Denies  o : rash, itching  · Neurologic  o Denies  o : headache, weakness, loss of consciousness  · Musculoskeletal  o Denies  o : painful, swollen joints  · Psychiatric  o Denies  o : drug/alcohol addiction, anxiety, depression      Vitals  Date Time BP Position Site L\R Cuff Size HR RR TEMP (F) WT  HT  BMI kg/m2 BSA m2 O2 Sat FR L/min FiO2        01/04/2021 01:56 PM      89 - R   165lbs 6oz 5'  9\" 24.42 1.91 99 %            Physical Examination  · Constitutional  o Appearance  o : well developed, well-nourished, no obvious deformities present  · Head and Face  o Head  o :   § Inspection  § : normocephalic  o Face  o :   § Inspection  § : no facial lesions  · Eyes  o Conjunctivae  o : conjunctivae normal  o Sclerae  o : sclerae white  · Ears, Nose, Mouth and Throat  o Ears  o :   § External Ears  § : appearance within normal limits  § Hearing  § : intact  o Nose  o :   § External Nose  § : appearance normal  · Neck  o Inspection/Palpation  o : normal appearance  o Range of Motion  o : full range of motion  · Respiratory  o Respiratory Effort  o : breathing unlabored  o Inspection of Chest  o : normal appearance  o Auscultation of Lungs  o : no audible wheezing or rales  · Cardiovascular  o Heart  o : regular rate  · Gastrointestinal  o Abdominal Examination  o : soft and non-tender  · Skin and Subcutaneous Tissue  o General " Inspection  o : intact, no rashes  · Psychiatric  o General  o : Alert and oriented x3  o Judgement and Insight  o : judgment and insight intact  o Mood and Affect  o : mood normal, affect appropriate  · Right Shoulder  o Inspection  o : Sensation grossly intact. Neurovascular intact. Skin intact. Appearance of his right shoulder is normal. Well healed scars, no signs of infection. Patient has full forward flexion. Good rotator cuff strength. No swelling, skin discoloration or atrophy. Good tone of deltoid, biceps, triceps, wrist extensors, and wrist flexors. Good flexion and extension of the elbow.   · Right Hand  o Inspection  o : Near full fist. Pain with full fist assistance. No swelling, skin discoloration or atrophy. Skin intact. Radial pulse 2+, ulnar pulse 2+. Patient able to wiggle fingers. Near full passive MC, PIP, and DIP joints. Good flexion and extension of wrist.           Assessment  · Aftercare following right rotator cuff repair SAD/DC     V54.81  · Arthralgia of right hand     719.44/M25.541  · Right shoulder pain, unspecified chronicity     719.41/M25.511      Plan  · Medications  o Medications have been Reconciled  o Transition of Care or Provider Policy  · Instructions  o Dr. Pretty saw and examined the patient and agrees with plan.   o Reviewed the patient's Past Medical, Social, and Family history as well as the ROS at today's visit, no changes.  o Call or return if worsening symptoms.  o Follow Up PRN.  o This note was transcribed by Lou Greer. anna  o Discussed diagnosis and treatment options with the patient. Patient will continue his occupation therapy and is working on his hand. Patient will follow-up with us if he has any worsening symptoms.             Electronically Signed by: Lou Greer-, Other -Author on January 7, 2021 08:12:03 AM  Electronically Co-signed by: Abhijit Pretty MD -Reviewer on January 7, 2021 09:47:04 PM

## 2021-05-15 VITALS — OXYGEN SATURATION: 98 % | BODY MASS INDEX: 24.44 KG/M2 | HEIGHT: 69 IN | WEIGHT: 165 LBS | HEART RATE: 86 BPM

## 2021-05-15 VITALS — HEART RATE: 88 BPM | WEIGHT: 164.5 LBS | HEIGHT: 69 IN | BODY MASS INDEX: 24.37 KG/M2 | OXYGEN SATURATION: 99 %

## 2021-05-15 VITALS — HEIGHT: 69 IN | HEART RATE: 87 BPM | OXYGEN SATURATION: 98 % | WEIGHT: 156 LBS | BODY MASS INDEX: 23.11 KG/M2

## 2021-05-15 VITALS — HEIGHT: 70 IN | OXYGEN SATURATION: 98 % | WEIGHT: 160 LBS | BODY MASS INDEX: 22.9 KG/M2 | HEART RATE: 86 BPM

## 2021-05-15 VITALS — OXYGEN SATURATION: 98 % | WEIGHT: 165 LBS | BODY MASS INDEX: 23.62 KG/M2 | HEIGHT: 70 IN | HEART RATE: 91 BPM

## 2021-05-15 VITALS — HEIGHT: 70 IN | WEIGHT: 157 LBS | BODY MASS INDEX: 22.48 KG/M2 | HEART RATE: 78 BPM

## 2021-05-15 VITALS — HEIGHT: 69 IN | WEIGHT: 155 LBS | BODY MASS INDEX: 22.96 KG/M2

## 2021-05-15 VITALS — BODY MASS INDEX: 24.44 KG/M2 | HEIGHT: 69 IN | WEIGHT: 165 LBS

## 2021-05-16 VITALS — WEIGHT: 153 LBS | HEIGHT: 70 IN | HEART RATE: 89 BPM | OXYGEN SATURATION: 98 % | BODY MASS INDEX: 21.9 KG/M2

## 2021-05-16 VITALS — WEIGHT: 181 LBS | HEIGHT: 70 IN | BODY MASS INDEX: 25.91 KG/M2 | HEART RATE: 93 BPM

## 2021-08-06 ENCOUNTER — OFFICE VISIT (OUTPATIENT)
Dept: ORTHOPEDIC SURGERY | Facility: CLINIC | Age: 56
End: 2021-08-06

## 2021-08-06 VITALS — BODY MASS INDEX: 24.44 KG/M2 | HEIGHT: 69 IN | OXYGEN SATURATION: 100 % | WEIGHT: 165 LBS | HEART RATE: 78 BPM

## 2021-08-06 DIAGNOSIS — M79.642 PAIN IN BOTH HANDS: Primary | ICD-10-CM

## 2021-08-06 DIAGNOSIS — M79.641 PAIN IN BOTH HANDS: Primary | ICD-10-CM

## 2021-08-06 DIAGNOSIS — G57.71 CAUSALGIA OF LOWER EXTREMITY, RIGHT: ICD-10-CM

## 2021-08-06 PROCEDURE — 99213 OFFICE O/P EST LOW 20 MIN: CPT | Performed by: ORTHOPAEDIC SURGERY

## 2021-08-06 NOTE — PROGRESS NOTES
"Chief Complaint  Initial Evaluation of the Left Hand and Initial Evaluation of the Right Hand     Subjective      Sandeep King presents to [facility] for an evaluation of bilateral hands. Patient has a history of causalgia of right hand. He states that therapy has given him relief and better motion. The cream prescribed to him gives him significant relief and patient requests a refill on this. He states that his left 5th digit has been stuck. He was told he had trigger finger. He denies any injury or trauma to his left hand. He denies any numbness and tingling to his left hand.     No Known Allergies     Social History     Socioeconomic History   • Marital status:      Spouse name: Not on file   • Number of children: Not on file   • Years of education: Not on file   • Highest education level: Not on file   Tobacco Use   • Smoking status: Current Every Day Smoker     Packs/day: 1.00     Years: 31.00     Pack years: 31.00   Vaping Use   • Vaping Use: Never used   Substance and Sexual Activity   • Alcohol use: Never   • Drug use: Never        Review of Systems     Objective   Vital Signs:   Pulse 78   Ht 175.3 cm (69\")   Wt 74.8 kg (165 lb)   SpO2 100%   BMI 24.37 kg/m²       Physical Exam  Constitutional:       Appearance: Normal appearance. He is well-developed and normal weight.   HENT:      Head: Normocephalic.      Right Ear: Hearing and external ear normal.      Left Ear: Hearing and external ear normal.      Nose: Nose normal.   Eyes:      Conjunctiva/sclera: Conjunctivae normal.   Cardiovascular:      Rate and Rhythm: Normal rate.   Pulmonary:      Effort: Pulmonary effort is normal.      Breath sounds: No wheezing or rales.   Abdominal:      Palpations: Abdomen is soft.      Tenderness: There is no abdominal tenderness.   Musculoskeletal:      Cervical back: Normal range of motion.   Skin:     Findings: No rash.   Neurological:      Mental Status: He is alert and " oriented to person, place, and time.   Psychiatric:         Mood and Affect: Mood and affect normal.         Judgment: Judgment normal.       Ortho Exam      RIGHT HAND: Neurovascular intact. Sensation grossly intact. Mild swelling, atrophy, and skin discoloration. Skin intact. Full ROM. Patient able to wiggle fingers and make a fist. Decreased motion of the hand. Radial pulse 2+, ulnar pulse 2+. Full wrist extension, full wrist flexion, full , full thumb opposition, full PIP flexors, full DIP flexors, full PIP extensors, full finger adduction, full finger abduction.    LEFT HAND: Neurovascular intact. Sensation grossly intact. No swelling, atrophy, and skin discoloration. Skin intact. Full ROM. Patient able to wiggle fingers and make a fist. Full wrist extension, full wrist flexion, full , full thumb opposition, full PIP flexors, full DIP flexors, full PIP extensors, full finger adduction, full finger abduction. Radial pulse 2+, ulnar pulse 2+. Flexor contracture of the 5th digit.       Procedures        Imaging Results (Most Recent)     None           Result Review :       No results found.          Assessment and Plan     DX: Right hand causalgia  Left hand pain    Discussed treatment plans and diagnosis with the patient. Patient was given a prescription for topical gel for his right hand. He will continue conservative management for bilateral hands.     Call or return if worsening symptoms.    Follow Up     PRN.       Patient was given instructions and counseling regarding his condition or for health maintenance advice. Please see specific information pulled into the AVS if appropriate.     Scribed for Abhijit Pretty MD by Lou Greer.  08/06/21   14:51 EDT    I have personally performed the services described in this document as scribed by the above individual and it is both accurate and complete. Abhijit Pretty MD 08/06/21

## 2021-10-26 ENCOUNTER — TELEPHONE (OUTPATIENT)
Dept: NEUROLOGY | Facility: CLINIC | Age: 56
End: 2021-10-26

## 2021-10-26 NOTE — TELEPHONE ENCOUNTER
TJ WITH [facility]     [phone]     A REQUEST WAS FAXED ON 9-3-21 FOR MEDICAL RECORDS.    GAVE TJ THE FAX NO. TO THE OFFICE BECAUSE THE FAX NUMBER SHE HAD WAS NOT CORRECT.

## 2023-05-09 ENCOUNTER — TRANSCRIBE ORDERS (OUTPATIENT)
Dept: ADMINISTRATIVE | Facility: HOSPITAL | Age: 58
End: 2023-05-09
Payer: MEDICARE

## 2023-05-09 DIAGNOSIS — J43.9 PULMONARY EMPHYSEMA, UNSPECIFIED EMPHYSEMA TYPE: Primary | ICD-10-CM

## 2023-05-16 ENCOUNTER — TRANSCRIBE ORDERS (OUTPATIENT)
Dept: ADMINISTRATIVE | Facility: HOSPITAL | Age: 58
End: 2023-05-16
Payer: MEDICARE

## 2023-05-16 DIAGNOSIS — Z13.6 ENCOUNTER FOR SCREENING FOR CARDIAC ALLOGRAFT VASCULOPATHY: Primary | ICD-10-CM

## 2023-05-18 ENCOUNTER — HOSPITAL ENCOUNTER (OUTPATIENT)
Dept: RESPIRATORY THERAPY | Facility: HOSPITAL | Age: 58
Discharge: HOME | End: 2023-05-18
Payer: MEDICARE

## 2023-05-18 DIAGNOSIS — J43.9 PULMONARY EMPHYSEMA, UNSPECIFIED EMPHYSEMA TYPE: ICD-10-CM

## 2023-05-18 PROCEDURE — 94010 BREATHING CAPACITY TEST: CPT

## 2023-05-18 PROCEDURE — 94729 DIFFUSING CAPACITY: CPT

## 2023-05-18 PROCEDURE — 94726 PLETHYSMOGRAPHY LUNG VOLUMES: CPT

## 2023-05-18 RX ORDER — LEVALBUTEROL INHALATION SOLUTION 1.25 MG/3ML
1.25 SOLUTION RESPIRATORY (INHALATION) ONCE
Status: COMPLETED | OUTPATIENT
Start: 2023-05-18 | End: 2023-05-18

## 2023-05-18 RX ADMIN — LEVALBUTEROL HYDROCHLORIDE 1.25 MG: 1.25 SOLUTION RESPIRATORY (INHALATION) at 09:16

## 2023-06-01 ENCOUNTER — OFFICE VISIT (OUTPATIENT)
Dept: SURGERY | Facility: CLINIC | Age: 58
End: 2023-06-01
Payer: MEDICARE

## 2023-06-01 VITALS — HEIGHT: 70 IN | BODY MASS INDEX: 23.91 KG/M2 | WEIGHT: 167 LBS | RESPIRATION RATE: 16 BRPM

## 2023-06-01 DIAGNOSIS — K80.20 GALLSTONES: Primary | ICD-10-CM

## 2023-06-01 RX ORDER — ACETAMINOPHEN 160 MG
1 TABLET,DISINTEGRATING ORAL DAILY
COMMUNITY
Start: 2023-05-09

## 2023-06-01 RX ORDER — BUDESONIDE AND FORMOTEROL FUMARATE DIHYDRATE 160; 4.5 UG/1; UG/1
AEROSOL RESPIRATORY (INHALATION)
COMMUNITY
Start: 2023-05-10

## 2023-06-01 RX ORDER — IPRATROPIUM BROMIDE AND ALBUTEROL 20; 100 UG/1; UG/1
1 SPRAY, METERED RESPIRATORY (INHALATION) 4 TIMES DAILY
COMMUNITY
Start: 2023-04-14

## 2023-06-01 RX ORDER — BUDESONIDE, GLYCOPYRROLATE, AND FORMOTEROL FUMARATE 160; 9; 4.8 UG/1; UG/1; UG/1
AEROSOL, METERED RESPIRATORY (INHALATION)
COMMUNITY
Start: 2023-05-18

## 2023-06-01 NOTE — PROGRESS NOTES
"Chief Complaint  Calculus of gallbladder without cholecystitis without obstr    Subjective        Sandeep King presents to [facility]  History of Present Illness    The patient is to be seen for gallstones. He is accompanied by an adult female.    Gallstones  The patient was found to have gallstones on a lung cancer screening. He denies any abdominal pain, nausea, or vomiting. He reports drinking and eating normally. He reports being told that the gallstones were large. His lung cancer screening was completed at [facility]. He reports discussing his condition with his PCP. His PCP stated that the gallstones could be fragmented and surgery could be avoid, according to the patient.      Objective   Vital Signs:  Resp 16   Ht 177.8 cm (70\")   Wt 75.8 kg (167 lb)   BMI 23.96 kg/m²   Estimated body mass index is 23.96 kg/m² as calculated from the following:    Height as of this encounter: 177.8 cm (70\").    Weight as of this encounter: 75.8 kg (167 lb).       BMI is within normal parameters. No other follow-up for BMI required.      Physical Exam  Constitutional:       Comments: The patient is in no acute distress.      Pulmonary:      Comments: The patient has nonlabored breathing.     Abdominal:      Comments: The patient's abdomen is soft with no tenderness.        Result Review :                   Assessment and Plan     1. The patient does have gallstones as noted on a lung screening CT scan. It was just noted as a large gallstone. I will try to get the CT images, the actual disc from [facility], to see if I can evaluate how big the stone is, but otherwise the patient is completely asymptomatic. He has no symptoms from having the gallstone. The patient denies having symptoms of nausea, vomiting, or pain. He reports eating and drinking normally and does not have any issues.     - The patient received a discussion about the risks of having extremely large " gallstones. He understands that there is the possibility of erosion through the gallbladder wall when having extremely large gallstones. There is not a measurement of the stone on this CT scan report. If it is very large, then I could potentially recommend surgery, but as defined by the [employer] College [employer]geons completely asymptomatic gallstones do not necessitate surgery and the recommendation is for watchful waiting. I discussed with him symptoms to monitor including, upper abdominal pain, nausea, vomiting, and other similar symptoms. If he decides he wants surgery, I have offered him surgical resection of his gallbladder. The risks, benefits, and alternatives of the procedure were discussed extensively. All the questions were answered. The patient voiced understanding and agreed to proceed. On the off-note, he reports that someone at his PCP's office told him that there was a nonsurgical way to treat gallstones, which is incorrect. There is no nonsurgical treatment for gallstones. Ursodiol is a medical therapy, but data suggests that this is almost completely ineffective.         Follow Up   No follow-ups on file.  Patient was given instructions and counseling regarding his condition or for health maintenance advice. Please see specific information pulled into the AVS if appropriate.     Transcribed from ambient dictation for José Miguel Santiago MD by Mary BROCK.  06/01/23   14:08 EDT    Patient or patient representative verbalized consent to the visit recording.  I have personally performed the services described in this document as transcribed by the above individual, and it is both accurate and complete.

## 2023-06-05 PROBLEM — K80.20 GALLSTONES: Status: ACTIVE | Noted: 2023-06-05

## 2023-06-09 ENCOUNTER — HOSPITAL ENCOUNTER (OUTPATIENT)
Dept: CT IMAGING | Facility: HOSPITAL | Age: 58
Discharge: HOME | End: 2023-06-09

## 2023-06-09 DIAGNOSIS — Z13.6 ENCOUNTER FOR SCREENING FOR CARDIAC ALLOGRAFT VASCULOPATHY: ICD-10-CM

## 2023-06-09 PROCEDURE — 75571 CT HRT W/O DYE W/CA TEST: CPT

## 2023-09-14 ENCOUNTER — OFFICE VISIT (OUTPATIENT)
Dept: CARDIOLOGY | Facility: CLINIC | Age: 58
End: 2023-09-14
Payer: MEDICARE

## 2023-09-14 VITALS
DIASTOLIC BLOOD PRESSURE: 88 MMHG | WEIGHT: 174 LBS | SYSTOLIC BLOOD PRESSURE: 149 MMHG | HEIGHT: 70 IN | HEART RATE: 87 BPM | BODY MASS INDEX: 24.91 KG/M2

## 2023-09-14 DIAGNOSIS — R07.89 CHEST DISCOMFORT: ICD-10-CM

## 2023-09-14 DIAGNOSIS — I25.10 CORONARY ARTERY CALCIFICATION SEEN ON CAT SCAN: Primary | ICD-10-CM

## 2023-09-14 DIAGNOSIS — F17.200 SMOKING: ICD-10-CM

## 2023-09-14 DIAGNOSIS — E78.2 MIXED DYSLIPIDEMIA: ICD-10-CM

## 2023-09-14 DIAGNOSIS — R06.09 DYSPNEA ON EXERTION: ICD-10-CM

## 2023-09-14 DIAGNOSIS — I10 ESSENTIAL HYPERTENSION: ICD-10-CM

## 2023-09-14 PROCEDURE — 93000 ELECTROCARDIOGRAM COMPLETE: CPT | Performed by: INTERNAL MEDICINE

## 2023-09-14 PROCEDURE — 99204 OFFICE O/P NEW MOD 45 MIN: CPT | Performed by: INTERNAL MEDICINE

## 2023-09-14 RX ORDER — METOPROLOL SUCCINATE 25 MG/1
25 TABLET, EXTENDED RELEASE ORAL DAILY
Qty: 90 TABLET | Refills: 3 | Status: SHIPPED | OUTPATIENT
Start: 2023-09-14

## 2023-09-14 RX ORDER — EVOLOCUMAB 140 MG/ML
140 INJECTION, SOLUTION SUBCUTANEOUS
COMMUNITY
Start: 2023-07-13

## 2023-09-14 RX ORDER — ASPIRIN 81 MG/1
81 TABLET ORAL DAILY
COMMUNITY

## 2023-09-14 NOTE — PROGRESS NOTES
Chief Complaint  Establish Care      History of Present Illness  Sandeep King presents to [facility]    This is a 58-year-old gentleman with active smoking, hypertension, dyslipidemia and family history of ischemic heart disease presents to clinic for cardiac evaluation.  He had recent coronary CT scan which showed elevated calcium score exceeding 300.  He has dyspnea with mild to moderate effort.  He has sporadic episodes of left-sided chest discomfort/thumping sensation.  It is unrelated to physical activities.  It is exacerbated by stress.  He has no other complaints.  He has no orthopnea, edema, presyncope or syncope.    Past Medical History:   Diagnosis Date    Arthritis     Herniated nucleus pulposus, L2-3 left     Herniated nucleus pulposus, L3-4     High cholesterol     Hyperlipemia     Hyperlipidemia     Leg pain     Leg swelling     Muscle cramps     Seasonal allergies          Current Outpatient Medications:     albuterol (PROVENTIL) (2.5 MG/3ML) 0.083% nebulizer solution, Take 2.5 mg by nebulization Every 4 (Four) Hours As Needed for Wheezing., Disp: , Rfl:     aspirin 81 MG EC tablet, Take 1 tablet by mouth Daily., Disp: , Rfl:     atorvastatin (LIPITOR) 20 MG tablet, atorvastatin 20 mg oral tablet take 1 tablet (20 mg) by oral route once daily   Active, Disp: , Rfl:     Breztri Aerosphere 160-9-4.8 MCG/ACT aerosol inhaler, , Disp: , Rfl:     Cholecalciferol (Vitamin D3) 50 MCG (2000 UT) capsule, Take 1 capsule by mouth Daily., Disp: , Rfl:     Combivent Respimat  MCG/ACT inhaler, 1 puff 4 (Four) Times a Day., Disp: , Rfl:     Diclofenac Sodium (VOLTAREN) 1 % gel gel, Apply 4 g topically to the appropriate area as directed 4 (Four) Times a Day As Needed (AS NEEDED)., Disp: 100 g, Rfl: 1    fluticasone (FLONASE) 50 MCG/ACT nasal spray, , Disp: , Rfl:     loratadine (CLARITIN) 10 MG tablet, , Disp: , Rfl:     meclizine (ANTIVERT) 25 MG tablet, , Disp: , Rfl:      "methylPREDNISolone (MEDROL) 4 MG tablet, Take 1 tablet by mouth Daily., Disp: , Rfl:     montelukast (SINGULAIR) 10 MG tablet, , Disp: , Rfl:     omeprazole (priLOSEC) 20 MG capsule, Take 1 capsule by mouth Daily., Disp: , Rfl:     Repatha SureClick solution auto-injector SureClick injection, Inject 1 mL under the skin into the appropriate area as directed Every 14 (Fourteen) Days., Disp: , Rfl:     Symbicort 160-4.5 MCG/ACT inhaler, , Disp: , Rfl:     metoprolol succinate XL (TOPROL-XL) 25 MG 24 hr tablet, Take 1 tablet by mouth Daily., Disp: 90 tablet, Rfl: 3    Medications Discontinued During This Encounter   Medication Reason    ondansetron ODT (ZOFRAN-ODT) 4 MG disintegrating tablet Side effects     No Known Allergies     Social History     Tobacco Use    Smoking status: Every Day     Packs/day: 1.00     Years: 31.00     Pack years: 31.00     Types: Cigarettes   Vaping Use    Vaping Use: Never used   Substance Use Topics    Alcohol use: Never    Drug use: Never       Family History   Problem Relation Age of Onset    Arthritis Mother     Stroke Father     Arthritis Father     Arthritis Sister     Arthritis Brother     Arthritis Daughter     Arthritis Son         Objective     /88   Pulse 87   Ht 177.8 cm (70\")   Wt 78.9 kg (174 lb)   BMI 24.97 kg/m²       Physical Exam  Constitutional:       General: Awake. Not in acute distress.     Appearance: Normal appearance.   Neck:      Vascular: No carotid bruit, hepatojugular reflux or JVD.   Cardiovascular:      Rate and Rhythm: Normal rate and regular rhythm.      Chest Wall: PMI is not displaced.      Heart sounds: Normal heart sounds, S1 normal and S2 normal. No murmur heard.   No friction rub. No gallop. No S3 or S4 sounds.    Pulmonary:      Effort: Pulmonary effort is normal.      Breath sounds: Normal breath sounds. No wheezing, rhonchi or rales.   Ext.      Right lower leg: No edema.      Left lower leg: No edema.   Skin:     General: Skin is warm and " dry.      Coloration: Skin is not cyanotic.      Findings: No petechiae or rash.   Neurological:      Mental Status: Alert and oriented x 3  Psychiatric:         Behavior: Behavior is cooperative.       Result Review :     No results found for: PROBNP              ECG 12 Lead    Date/Time: 9/14/2023 11:15 AM  Performed by: Leslie Venegas MD  Authorized by: Leslie Venegas MD   Comparison: compared with previous ECG   Similar to previous ECG  Rhythm: sinus rhythm  BPM: 84  Conduction: left anterior fascicular block  QRS axis: left  Other findings: early transition          No results found for this or any previous visit.                Diagnoses and all orders for this visit:    1. Coronary artery calcification seen on CAT scan (Primary)    2. Dyspnea on exertion  -     Stress Test With Myocardial Perfusion One Day; Future  -     Adult Transthoracic Echo Complete W/ Cont if Necessary Per Protocol; Future    3. Chest discomfort  -     Stress Test With Myocardial Perfusion One Day; Future  -     Adult Transthoracic Echo Complete W/ Cont if Necessary Per Protocol; Future    4. Smoking    Other orders  -     metoprolol succinate XL (TOPROL-XL) 25 MG 24 hr tablet; Take 1 tablet by mouth Daily.  Dispense: 90 tablet; Refill: 3      Assessment:    Coronary artery calcification: Recent cardiac CT scan was noted and demonstrates calcium score of 311.  The patient has dyspnea on exertion and atypical chest discomfort.  His exam is benign.  His ECG shows normal sinus rhythm with left anterior fascicular block and early precordial RS transition.  He will be scheduled for exercise nuclear stress test to assess for ischemic ST-T changes and myocardial ischemia.  Echo will be done for LVEF, wall motion and valvular function.  He will be started on metoprolol XL 25 mg daily for better blood pressure control and to help with his symptoms.  He will be continued on aspirin, atorvastatin and Repatha.  Further recommendations to  follow.    Mixed dyslipidemia: On atorvastatin and Repatha.  Continue the same.    Essential hypertension: Uncontrolled.  Metoprolol XL will be added to his current regimen.    Smoking: The importance of smoking cessation was discussed with him.  He is not interested in quitting at this time.    Follow Up       Return for With Gisella CRAWFODR, Return to clinic after diagnostic testing.    Patient was given instructions and counseling regarding his condition or for health maintenance advice. Please see specific information pulled into the AVS if appropriate.

## 2023-11-17 ENCOUNTER — TRANSCRIBE ORDERS (OUTPATIENT)
Dept: ADMINISTRATIVE | Facility: HOSPITAL | Age: 58
End: 2023-11-17
Payer: MEDICARE

## 2023-11-17 DIAGNOSIS — I25.84 CORONARY ATHEROSCLEROSIS DUE TO SEVERELY CALCIFIED CORONARY LESION: ICD-10-CM

## 2023-11-17 DIAGNOSIS — R06.02 SHORTNESS OF BREATH: Primary | ICD-10-CM

## 2023-12-14 ENCOUNTER — HOSPITAL ENCOUNTER (OUTPATIENT)
Dept: NUCLEAR MEDICINE | Facility: HOSPITAL | Age: 58
Discharge: HOME | End: 2023-12-14
Payer: MEDICARE

## 2023-12-14 DIAGNOSIS — I25.84 CORONARY ATHEROSCLEROSIS DUE TO SEVERELY CALCIFIED CORONARY LESION: ICD-10-CM

## 2023-12-14 DIAGNOSIS — R06.02 SHORTNESS OF BREATH: ICD-10-CM

## 2023-12-14 LAB
BH CV IMMEDIATE POST TECH DATA BLOOD PRESSURE: NORMAL MMHG
BH CV IMMEDIATE POST TECH DATA HEART RATE: 110 BPM
BH CV IMMEDIATE POST TECH DATA OXYGEN SATS: 95 %
BH CV REST NUCLEAR ISOTOPE DOSE: 9.1 MCI
BH CV SIX MINUTE RECOVERY TECH DATA BLOOD PRESSURE: NORMAL
BH CV SIX MINUTE RECOVERY TECH DATA HEART RATE: 102 BPM
BH CV SIX MINUTE RECOVERY TECH DATA OXYGEN SATURATION: 97 %
BH CV STRESS BP STAGE 1: NORMAL
BH CV STRESS COMMENTS STAGE 1: NORMAL
BH CV STRESS DOSE REGADENOSON STAGE 1: 0.4
BH CV STRESS DURATION MIN STAGE 1: 0
BH CV STRESS DURATION SEC STAGE 1: 10
BH CV STRESS HR STAGE 1: 119
BH CV STRESS NUCLEAR ISOTOPE DOSE: 34.6 MCI
BH CV STRESS O2 STAGE 1: 95
BH CV STRESS PROTOCOL 1: NORMAL
BH CV STRESS STAGE 1: 1
BH CV THREE MINUTE POST TECH DATA BLOOD PRESSURE: NORMAL MMHG
BH CV THREE MINUTE POST TECH DATA HEART RATE: 107 BPM
BH CV THREE MINUTE POST TECH DATA OXYGEN SATURATION: 97 %
LV EF NUC BP: 42 %
MAXIMAL PREDICTED HEART RATE: 160 BPM
PERCENT MAX PREDICTED HR: 74.38 %
STRESS BASELINE BP: NORMAL MMHG
STRESS BASELINE HR: 79 BPM
STRESS O2 SAT REST: 98 %
STRESS PERCENT HR: 88 %
STRESS POST O2 SAT PEAK: 95 %
STRESS POST PEAK BP: NORMAL MMHG
STRESS POST PEAK HR: 119 BPM
STRESS TARGET HR: 136 BPM

## 2023-12-14 PROCEDURE — 93017 CV STRESS TEST TRACING ONLY: CPT

## 2023-12-14 PROCEDURE — 78452 HT MUSCLE IMAGE SPECT MULT: CPT

## 2023-12-14 PROCEDURE — 25010000002 REGADENOSON 0.4 MG/5ML SOLUTION: Performed by: SPECIALIST

## 2023-12-14 PROCEDURE — 0 TECHNETIUM TETROFOSMIN KIT: Performed by: SPECIALIST

## 2023-12-14 PROCEDURE — A9502 TC99M TETROFOSMIN: HCPCS | Performed by: SPECIALIST

## 2023-12-14 RX ORDER — REGADENOSON 0.08 MG/ML
0.4 INJECTION, SOLUTION INTRAVENOUS
Status: COMPLETED | OUTPATIENT
Start: 2023-12-14 | End: 2023-12-14

## 2023-12-14 RX ADMIN — TETROFOSMIN 1 DOSE: 1.38 INJECTION, POWDER, LYOPHILIZED, FOR SOLUTION INTRAVENOUS at 10:11

## 2023-12-14 RX ADMIN — REGADENOSON 0.4 MG: 0.08 INJECTION, SOLUTION INTRAVENOUS at 10:11

## 2023-12-14 RX ADMIN — TETROFOSMIN 1 DOSE: 1.38 INJECTION, POWDER, LYOPHILIZED, FOR SOLUTION INTRAVENOUS at 08:20
